# Patient Record
Sex: FEMALE | NOT HISPANIC OR LATINO | URBAN - METROPOLITAN AREA
[De-identification: names, ages, dates, MRNs, and addresses within clinical notes are randomized per-mention and may not be internally consistent; named-entity substitution may affect disease eponyms.]

---

## 2017-01-15 ENCOUNTER — INPATIENT (INPATIENT)
Facility: HOSPITAL | Age: 27
LOS: 1 days | Discharge: HOME | End: 2017-01-17
Attending: OBSTETRICS & GYNECOLOGY | Admitting: OBSTETRICS & GYNECOLOGY
Payer: MEDICAID

## 2017-01-15 VITALS
TEMPERATURE: 98 F | OXYGEN SATURATION: 98 % | HEART RATE: 62 BPM | SYSTOLIC BLOOD PRESSURE: 103 MMHG | DIASTOLIC BLOOD PRESSURE: 45 MMHG | RESPIRATION RATE: 16 BRPM

## 2017-01-15 LAB
ALBUMIN SERPL ELPH-MCNC: 4.5 G/DL — SIGNIFICANT CHANGE UP (ref 3.3–5)
ALP SERPL-CCNC: 77 U/L — SIGNIFICANT CHANGE UP (ref 40–120)
ALT FLD-CCNC: 10 U/L — SIGNIFICANT CHANGE UP (ref 4–33)
AMYLASE P1 CFR SERPL: 48 U/L — SIGNIFICANT CHANGE UP (ref 25–125)
APPEARANCE UR: SIGNIFICANT CHANGE UP
AST SERPL-CCNC: 13 U/L — SIGNIFICANT CHANGE UP (ref 4–32)
BACTERIA # UR AUTO: SIGNIFICANT CHANGE UP
BASOPHILS # BLD AUTO: 0.03 K/UL — SIGNIFICANT CHANGE UP (ref 0–0.2)
BASOPHILS NFR BLD AUTO: 0.3 % — SIGNIFICANT CHANGE UP (ref 0–2)
BILIRUB SERPL-MCNC: 0.2 MG/DL — SIGNIFICANT CHANGE UP (ref 0.2–1.2)
BILIRUB UR-MCNC: NEGATIVE — SIGNIFICANT CHANGE UP
BLOOD UR QL VISUAL: NEGATIVE — SIGNIFICANT CHANGE UP
BUN SERPL-MCNC: 11 MG/DL — SIGNIFICANT CHANGE UP (ref 7–23)
CALCIUM SERPL-MCNC: 9.5 MG/DL — SIGNIFICANT CHANGE UP (ref 8.4–10.5)
CHLORIDE SERPL-SCNC: 102 MMOL/L — SIGNIFICANT CHANGE UP (ref 98–107)
CO2 SERPL-SCNC: 25 MMOL/L — SIGNIFICANT CHANGE UP (ref 22–31)
COLOR SPEC: YELLOW — SIGNIFICANT CHANGE UP
CREAT SERPL-MCNC: 0.75 MG/DL — SIGNIFICANT CHANGE UP (ref 0.5–1.3)
EOSINOPHIL # BLD AUTO: 0.08 K/UL — SIGNIFICANT CHANGE UP (ref 0–0.5)
EOSINOPHIL NFR BLD AUTO: 0.7 % — SIGNIFICANT CHANGE UP (ref 0–6)
GLUCOSE SERPL-MCNC: 99 MG/DL — SIGNIFICANT CHANGE UP (ref 70–99)
GLUCOSE UR-MCNC: NEGATIVE — SIGNIFICANT CHANGE UP
HCT VFR BLD CALC: 40 % — SIGNIFICANT CHANGE UP (ref 34.5–45)
HGB BLD-MCNC: 13 G/DL — SIGNIFICANT CHANGE UP (ref 11.5–15.5)
IMM GRANULOCYTES NFR BLD AUTO: 0.2 % — SIGNIFICANT CHANGE UP (ref 0–1.5)
KETONES UR-MCNC: SIGNIFICANT CHANGE UP
LEUKOCYTE ESTERASE UR-ACNC: NEGATIVE — SIGNIFICANT CHANGE UP
LIDOCAIN IGE QN: 37 U/L — SIGNIFICANT CHANGE UP (ref 7–60)
LYMPHOCYTES # BLD AUTO: 2.87 K/UL — SIGNIFICANT CHANGE UP (ref 1–3.3)
LYMPHOCYTES # BLD AUTO: 25.9 % — SIGNIFICANT CHANGE UP (ref 13–44)
MCHC RBC-ENTMCNC: 29.1 PG — SIGNIFICANT CHANGE UP (ref 27–34)
MCHC RBC-ENTMCNC: 32.5 % — SIGNIFICANT CHANGE UP (ref 32–36)
MCV RBC AUTO: 89.7 FL — SIGNIFICANT CHANGE UP (ref 80–100)
MONOCYTES # BLD AUTO: 0.6 K/UL — SIGNIFICANT CHANGE UP (ref 0–0.9)
MONOCYTES NFR BLD AUTO: 5.4 % — SIGNIFICANT CHANGE UP (ref 2–14)
NEUTROPHILS # BLD AUTO: 7.49 K/UL — HIGH (ref 1.8–7.4)
NEUTROPHILS NFR BLD AUTO: 67.5 % — SIGNIFICANT CHANGE UP (ref 43–77)
NITRITE UR-MCNC: NEGATIVE — SIGNIFICANT CHANGE UP
PH UR: 6.5 — SIGNIFICANT CHANGE UP (ref 4.6–8)
PLATELET # BLD AUTO: 447 K/UL — HIGH (ref 150–400)
PMV BLD: 9.1 FL — SIGNIFICANT CHANGE UP (ref 7–13)
POTASSIUM SERPL-MCNC: 3.9 MMOL/L — SIGNIFICANT CHANGE UP (ref 3.5–5.3)
POTASSIUM SERPL-SCNC: 3.9 MMOL/L — SIGNIFICANT CHANGE UP (ref 3.5–5.3)
PROT SERPL-MCNC: 7.3 G/DL — SIGNIFICANT CHANGE UP (ref 6–8.3)
PROT UR-MCNC: 30 — HIGH
RBC # BLD: 4.46 M/UL — SIGNIFICANT CHANGE UP (ref 3.8–5.2)
RBC # FLD: 13.6 % — SIGNIFICANT CHANGE UP (ref 10.3–14.5)
RBC CASTS # UR COMP ASSIST: SIGNIFICANT CHANGE UP (ref 0–?)
SODIUM SERPL-SCNC: 139 MMOL/L — SIGNIFICANT CHANGE UP (ref 135–145)
SP GR SPEC: 1.04 — HIGH (ref 1–1.03)
SQUAMOUS # UR AUTO: SIGNIFICANT CHANGE UP
UROBILINOGEN FLD QL: 4 E.U. — HIGH (ref 0.1–0.2)
WBC # BLD: 11.09 K/UL — HIGH (ref 3.8–10.5)
WBC # FLD AUTO: 11.09 K/UL — HIGH (ref 3.8–10.5)
WBC UR QL: SIGNIFICANT CHANGE UP (ref 0–?)
YEAST BUDDING # UR COMP ASSIST: SIGNIFICANT CHANGE UP

## 2017-01-15 PROCEDURE — 74177 CT ABD & PELVIS W/CONTRAST: CPT | Mod: 26

## 2017-01-15 RX ORDER — MORPHINE SULFATE 50 MG/1
4 CAPSULE, EXTENDED RELEASE ORAL ONCE
Qty: 0 | Refills: 0 | Status: DISCONTINUED | OUTPATIENT
Start: 2017-01-15 | End: 2017-01-15

## 2017-01-15 RX ORDER — SODIUM CHLORIDE 9 MG/ML
2000 INJECTION INTRAMUSCULAR; INTRAVENOUS; SUBCUTANEOUS ONCE
Qty: 0 | Refills: 0 | Status: COMPLETED | OUTPATIENT
Start: 2017-01-15 | End: 2017-01-15

## 2017-01-15 RX ORDER — ONDANSETRON 8 MG/1
4 TABLET, FILM COATED ORAL ONCE
Qty: 0 | Refills: 0 | Status: COMPLETED | OUTPATIENT
Start: 2017-01-15 | End: 2017-01-15

## 2017-01-15 RX ADMIN — MORPHINE SULFATE 4 MILLIGRAM(S): 50 CAPSULE, EXTENDED RELEASE ORAL at 20:31

## 2017-01-15 RX ADMIN — MORPHINE SULFATE 4 MILLIGRAM(S): 50 CAPSULE, EXTENDED RELEASE ORAL at 18:00

## 2017-01-15 RX ADMIN — ONDANSETRON 4 MILLIGRAM(S): 8 TABLET, FILM COATED ORAL at 17:21

## 2017-01-15 RX ADMIN — MORPHINE SULFATE 4 MILLIGRAM(S): 50 CAPSULE, EXTENDED RELEASE ORAL at 17:21

## 2017-01-15 RX ADMIN — SODIUM CHLORIDE 2000 MILLILITER(S): 9 INJECTION INTRAMUSCULAR; INTRAVENOUS; SUBCUTANEOUS at 17:10

## 2017-01-15 NOTE — ED ADULT TRIAGE NOTE - CHIEF COMPLAINT QUOTE
pt amb to triage c/o RLQ pain since yesterday w/ pain radiating to R flank, N/V since yesterday, denies urinary symptoms, LMP past Wednesday, PMHx epilepsy

## 2017-01-15 NOTE — ED PROVIDER NOTE - OBJECTIVE STATEMENT
27yo female with pmh of epilepsy presents with right lower quadrant pain for one day. Pt states the pain started at right flank then towards rlq, with nausea and multiple episodes of nbnb emesis. Pt denies urinary symptoms, diarrhea, vaginal discharge/bleeding, ho of ovarian pathology, ho renal colic, cp/sob/palp, recent travel and sickness. LMP 1/9/16.

## 2017-01-16 VITALS
RESPIRATION RATE: 20 BRPM | OXYGEN SATURATION: 100 % | SYSTOLIC BLOOD PRESSURE: 115 MMHG | HEART RATE: 79 BPM | DIASTOLIC BLOOD PRESSURE: 62 MMHG

## 2017-01-16 DIAGNOSIS — Q07.00 ARNOLD-CHIARI SYNDROME WITHOUT SPINA BIFIDA OR HYDROCEPHALUS: Chronic | ICD-10-CM

## 2017-01-16 DIAGNOSIS — N83.519 TORSION OF OVARY AND OVARIAN PEDICLE, UNSPECIFIED SIDE: ICD-10-CM

## 2017-01-16 LAB
APTT BLD: 31 SEC — SIGNIFICANT CHANGE UP (ref 27.5–37.4)
BLD GP AB SCN SERPL QL: NEGATIVE — SIGNIFICANT CHANGE UP
INR BLD: 1.04 — SIGNIFICANT CHANGE UP (ref 0.87–1.18)
PROTHROM AB SERPL-ACNC: 11.9 SEC — SIGNIFICANT CHANGE UP (ref 10–13.1)
RH IG SCN BLD-IMP: POSITIVE — SIGNIFICANT CHANGE UP
RH IG SCN BLD-IMP: POSITIVE — SIGNIFICANT CHANGE UP

## 2017-01-16 PROCEDURE — 88305 TISSUE EXAM BY PATHOLOGIST: CPT | Mod: 26

## 2017-01-16 PROCEDURE — 76830 TRANSVAGINAL US NON-OB: CPT | Mod: 26

## 2017-01-16 PROCEDURE — 58662 LAPAROSCOPY EXCISE LESIONS: CPT | Mod: 22

## 2017-01-16 RX ORDER — SODIUM CHLORIDE 9 MG/ML
1000 INJECTION, SOLUTION INTRAVENOUS
Qty: 0 | Refills: 0 | Status: DISCONTINUED | OUTPATIENT
Start: 2017-01-16 | End: 2017-01-17

## 2017-01-16 RX ORDER — IBUPROFEN 200 MG
1 TABLET ORAL
Qty: 0 | Refills: 0 | COMMUNITY

## 2017-01-16 RX ORDER — OXYCODONE HYDROCHLORIDE 5 MG/1
1 TABLET ORAL
Qty: 20 | Refills: 0 | OUTPATIENT
Start: 2017-01-16

## 2017-01-16 RX ORDER — OXYCODONE HYDROCHLORIDE 5 MG/1
10 TABLET ORAL ONCE
Qty: 0 | Refills: 0 | Status: DISCONTINUED | OUTPATIENT
Start: 2017-01-16 | End: 2017-01-17

## 2017-01-16 RX ORDER — SODIUM CHLORIDE 9 MG/ML
1000 INJECTION, SOLUTION INTRAVENOUS
Qty: 0 | Refills: 0 | Status: DISCONTINUED | OUTPATIENT
Start: 2017-01-16 | End: 2017-01-16

## 2017-01-16 RX ORDER — GABAPENTIN 400 MG/1
1 CAPSULE ORAL
Qty: 0 | Refills: 0 | COMMUNITY

## 2017-01-16 RX ORDER — HYDROMORPHONE HYDROCHLORIDE 2 MG/ML
1 INJECTION INTRAMUSCULAR; INTRAVENOUS; SUBCUTANEOUS
Qty: 0 | Refills: 0 | Status: DISCONTINUED | OUTPATIENT
Start: 2017-01-16 | End: 2017-01-17

## 2017-01-16 RX ORDER — ACETAMINOPHEN 500 MG
650 TABLET ORAL ONCE
Qty: 0 | Refills: 0 | Status: DISCONTINUED | OUTPATIENT
Start: 2017-01-16 | End: 2017-01-17

## 2017-01-16 RX ORDER — ACETAMINOPHEN 500 MG
3 TABLET ORAL
Qty: 0 | Refills: 0 | COMMUNITY

## 2017-01-16 RX ORDER — KETOROLAC TROMETHAMINE 30 MG/ML
30 SYRINGE (ML) INJECTION ONCE
Qty: 0 | Refills: 0 | Status: DISCONTINUED | OUTPATIENT
Start: 2017-01-16 | End: 2017-01-17

## 2017-01-16 RX ORDER — LAMOTRIGINE 25 MG/1
2 TABLET, ORALLY DISINTEGRATING ORAL
Qty: 0 | Refills: 0 | COMMUNITY

## 2017-01-16 RX ORDER — ACETAMINOPHEN 500 MG
1000 TABLET ORAL ONCE
Qty: 0 | Refills: 0 | Status: COMPLETED | OUTPATIENT
Start: 2017-01-16 | End: 2017-01-16

## 2017-01-16 RX ORDER — ONDANSETRON 8 MG/1
4 TABLET, FILM COATED ORAL ONCE
Qty: 0 | Refills: 0 | Status: DISCONTINUED | OUTPATIENT
Start: 2017-01-16 | End: 2017-01-17

## 2017-01-16 RX ADMIN — Medication 1000 MILLIGRAM(S): at 05:53

## 2017-01-16 RX ADMIN — Medication 400 MILLIGRAM(S): at 05:15

## 2017-01-16 RX ADMIN — SODIUM CHLORIDE 125 MILLILITER(S): 9 INJECTION, SOLUTION INTRAVENOUS at 07:23

## 2017-01-16 RX ADMIN — SODIUM CHLORIDE 125 MILLILITER(S): 9 INJECTION, SOLUTION INTRAVENOUS at 03:37

## 2017-01-16 NOTE — PATIENT PROFILE ADULT. - LANGUAGE ASSISTANCE NEEDED
No-Patient/Caregiver offered and refused free interpretation services./pt able to make needs known in english

## 2017-01-16 NOTE — DISCHARGE NOTE ADULT - CARE PROVIDER_API CALL
Chen Zamudio), Obstetrics and Gynecology  [address] [place] [address]  Phone: [phone]  Fax: [phone]

## 2017-01-16 NOTE — DISCHARGE NOTE ADULT - CARE PLAN
Principal Discharge DX:	Ovarian torsion  Goal:	recovery  Instructions for follow-up, activity and diet:	Follow up with [facility], activity as tolerated, regular diet.  Pelvic rest X2 weeks

## 2017-01-16 NOTE — H&P ADULT. - PROBLEM SELECTOR PLAN 1
1) admit to GYN  2) NPO IVF  3) for OR for detorsion of ovary, right ovarian cystectomy  Jose R2  D/w Emely Pickett and Dr. Ward

## 2017-01-16 NOTE — DISCHARGE NOTE ADULT - CARE PROVIDERS DIRECT ADDRESSES
[email or web address]

## 2017-01-16 NOTE — DISCHARGE NOTE ADULT - MEDICATION SUMMARY - MEDICATIONS TO TAKE
I will START or STAY ON the medications listed below when I get home from the hospital:    Tylenol 325 mg oral tablet  -- 3 tab(s) by mouth every 6 hours  -- Indication: For pain    Motrin 600 mg oral tablet  -- 1 tab(s) by mouth every 6 hours  -- Indication: For pain    oxyCODONE 5 mg oral tablet  -- 1 tab(s) by mouth every 6 hours, As Needed MDD:4  -- Indication: For pain

## 2017-01-16 NOTE — BRIEF OPERATIVE NOTE - OPERATION/FINDINGS
Large right ovarian cyst, omental adhesion, uterus adherent to anterior abdominal wall, normal appearing left ovary and tubes

## 2017-01-16 NOTE — H&P ADULT. - HISTORY OF PRESENT ILLNESS
26  LMP 16 c/o back pain that started at 2PM yesterday-sharp and stabbing, relieved by lying on her left side.  When she is lying straight, standing, or walking, the stabbing pain is unbearable.  +nausea +vomiting.  Never expereinced this pain before.  Denies dysuria, CP/SOB/dizziness/fever/chills  ob hx: C/s X2 in [place]  gyn hx: denies h/o cysts, has not seen GYN [facility] [place] [facility]  pmh: abscence seizures-last seizure 6 years ago, last convulsion one year ago, Chiari malformation (2)  psh C/sX2, Chiari repair at 21 yo  all none  meds lamictal, cymbalta, gabapentin  fam hx denies  social smokes 1 pack/3 days  Last meal 1:40A

## 2017-01-16 NOTE — PRE-OP CHECKLIST - 2.
patient preferred to keep belongings down stairs in locker area patient preferred to keep belongings down stairs in locker area locker#52 top/bottom

## 2017-01-16 NOTE — DISCHARGE NOTE ADULT - PATIENT PORTAL LINK FT
“You can access the Follow[employer] Patient Portal, offered by [facility], by registering with the following website: [email or web address]”

## 2017-01-16 NOTE — DISCHARGE NOTE ADULT - HOSPITAL COURSE
26  a/f ovarian torsion-detorsion, s/p laparoscopic detorsion of ovary, right ovarian cystectomy, see operative note for details of procedure.  At time of discharge patient is ambulating, tolerating regular diet, pain controlled, and urinating spontaneously.  Patient to have close follow up with [facility].

## 2017-01-25 LAB — SURGICAL PATHOLOGY STUDY: SIGNIFICANT CHANGE UP

## 2017-11-08 PROBLEM — Q07.00 ARNOLD-CHIARI SYNDROME WITHOUT SPINA BIFIDA OR HYDROCEPHALUS: Chronic | Status: ACTIVE | Noted: 2017-01-16

## 2017-11-08 PROBLEM — G40.909 EPILEPSY, UNSPECIFIED, NOT INTRACTABLE, WITHOUT STATUS EPILEPTICUS: Chronic | Status: ACTIVE | Noted: 2017-01-15

## 2017-11-15 ENCOUNTER — APPOINTMENT (OUTPATIENT)
Dept: NEUROSURGERY | Facility: CLINIC | Age: 27
End: 2017-11-15
Payer: MEDICAID

## 2017-11-15 VITALS
SYSTOLIC BLOOD PRESSURE: 113 MMHG | DIASTOLIC BLOOD PRESSURE: 71 MMHG | WEIGHT: 160 LBS | BODY MASS INDEX: 29.44 KG/M2 | OXYGEN SATURATION: 98 % | HEART RATE: 80 BPM | HEIGHT: 62 IN

## 2017-11-15 DIAGNOSIS — Z00.00 ENCOUNTER FOR GENERAL ADULT MEDICAL EXAMINATION W/OUT ABNORMAL FINDINGS: ICD-10-CM

## 2017-11-15 DIAGNOSIS — Z87.898 PERSONAL HISTORY OF OTHER SPECIFIED CONDITIONS: ICD-10-CM

## 2017-11-15 PROCEDURE — 99205 OFFICE O/P NEW HI 60 MIN: CPT

## 2017-11-15 RX ORDER — LAMOTRIGINE 200 MG/1
200 TABLET, ORALLY DISINTEGRATING ORAL
Refills: 0 | Status: ACTIVE | COMMUNITY

## 2017-11-15 RX ORDER — GABAPENTIN 400 MG/1
CAPSULE ORAL
Refills: 0 | Status: ACTIVE | COMMUNITY

## 2017-12-01 ENCOUNTER — OUTPATIENT (OUTPATIENT)
Dept: OUTPATIENT SERVICES | Facility: HOSPITAL | Age: 27
LOS: 1 days | Discharge: HOME | End: 2017-12-01
Payer: COMMERCIAL

## 2017-12-01 DIAGNOSIS — Q07.00 ARNOLD-CHIARI SYNDROME WITHOUT SPINA BIFIDA OR HYDROCEPHALUS: Chronic | ICD-10-CM

## 2017-12-01 LAB
ALBUMIN SERPL ELPH-MCNC: 4.8 G/DL — SIGNIFICANT CHANGE UP (ref 3.3–5)
ALP SERPL-CCNC: 83 U/L — SIGNIFICANT CHANGE UP (ref 40–120)
ALT FLD-CCNC: 11 U/L — SIGNIFICANT CHANGE UP (ref 10–45)
ANION GAP SERPL CALC-SCNC: 14 MMOL/L — SIGNIFICANT CHANGE UP (ref 5–17)
APTT BLD: 38.7 SEC — HIGH (ref 27.5–37.4)
AST SERPL-CCNC: 14 U/L — SIGNIFICANT CHANGE UP (ref 10–40)
BILIRUB SERPL-MCNC: 0.4 MG/DL — SIGNIFICANT CHANGE UP (ref 0.2–1.2)
BUN SERPL-MCNC: 13 MG/DL — SIGNIFICANT CHANGE UP (ref 7–23)
CALCIUM SERPL-MCNC: 9.8 MG/DL — SIGNIFICANT CHANGE UP (ref 8.4–10.5)
CHLORIDE SERPL-SCNC: 96 MMOL/L — SIGNIFICANT CHANGE UP (ref 96–108)
CO2 SERPL-SCNC: 22 MMOL/L — SIGNIFICANT CHANGE UP (ref 22–31)
CREAT SERPL-MCNC: 0.78 MG/DL — SIGNIFICANT CHANGE UP (ref 0.5–1.3)
GLUCOSE SERPL-MCNC: 90 MG/DL — SIGNIFICANT CHANGE UP (ref 70–99)
HCG SERPL-ACNC: <.1 MIU/ML — SIGNIFICANT CHANGE UP
HCT VFR BLD CALC: 38.5 % — SIGNIFICANT CHANGE UP (ref 34.5–45)
HGB BLD-MCNC: 13.2 G/DL — SIGNIFICANT CHANGE UP (ref 11.5–15.5)
INR BLD: 1.03 — SIGNIFICANT CHANGE UP (ref 0.88–1.16)
MCHC RBC-ENTMCNC: 30.2 PG — SIGNIFICANT CHANGE UP (ref 27–34)
MCHC RBC-ENTMCNC: 34.3 G/DL — SIGNIFICANT CHANGE UP (ref 32–36)
MCV RBC AUTO: 88.1 FL — SIGNIFICANT CHANGE UP (ref 80–100)
PLATELET # BLD AUTO: 455 K/UL — HIGH (ref 150–400)
POTASSIUM SERPL-MCNC: 3.9 MMOL/L — SIGNIFICANT CHANGE UP (ref 3.5–5.3)
POTASSIUM SERPL-SCNC: 3.9 MMOL/L — SIGNIFICANT CHANGE UP (ref 3.5–5.3)
PROT SERPL-MCNC: 8.1 G/DL — SIGNIFICANT CHANGE UP (ref 6–8.3)
PROTHROM AB SERPL-ACNC: 11.4 SEC — SIGNIFICANT CHANGE UP (ref 9.8–12.7)
RBC # BLD: 4.37 M/UL — SIGNIFICANT CHANGE UP (ref 3.8–5.2)
RBC # FLD: 13.7 % — SIGNIFICANT CHANGE UP (ref 10.3–16.9)
SODIUM SERPL-SCNC: 132 MMOL/L — LOW (ref 135–145)
WBC # BLD: 10.4 K/UL — SIGNIFICANT CHANGE UP (ref 3.8–10.5)
WBC # FLD AUTO: 10.4 K/UL — SIGNIFICANT CHANGE UP (ref 3.8–10.5)

## 2017-12-01 PROCEDURE — 36223 PLACE CATH CAROTID/INOM ART: CPT | Mod: 59,LT

## 2017-12-01 PROCEDURE — C1769: CPT

## 2017-12-01 PROCEDURE — 85730 THROMBOPLASTIN TIME PARTIAL: CPT

## 2017-12-01 PROCEDURE — 80053 COMPREHEN METABOLIC PANEL: CPT

## 2017-12-01 PROCEDURE — 36224 PLACE CATH CAROTD ART: CPT | Mod: RT

## 2017-12-01 PROCEDURE — 36226 PLACE CATH VERTEBRAL ART: CPT | Mod: LT

## 2017-12-01 PROCEDURE — 85610 PROTHROMBIN TIME: CPT

## 2017-12-01 PROCEDURE — C1894: CPT

## 2017-12-01 PROCEDURE — 85027 COMPLETE CBC AUTOMATED: CPT

## 2017-12-01 PROCEDURE — 84702 CHORIONIC GONADOTROPIN TEST: CPT

## 2017-12-01 PROCEDURE — 36225 PLACE CATH SUBCLAVIAN ART: CPT | Mod: LT

## 2017-12-01 PROCEDURE — 36227 PLACE CATH XTRNL CAROTID: CPT | Mod: RT

## 2017-12-01 PROCEDURE — C1887: CPT

## 2017-12-01 PROCEDURE — 36415 COLL VENOUS BLD VENIPUNCTURE: CPT

## 2017-12-01 RX ORDER — SODIUM CHLORIDE 9 MG/ML
1000 INJECTION INTRAMUSCULAR; INTRAVENOUS; SUBCUTANEOUS
Qty: 0 | Refills: 0 | Status: DISCONTINUED | OUTPATIENT
Start: 2017-12-01 | End: 2017-12-01

## 2017-12-01 RX ORDER — ACETAMINOPHEN 500 MG
650 TABLET ORAL ONCE
Qty: 0 | Refills: 0 | Status: DISCONTINUED | OUTPATIENT
Start: 2017-12-01 | End: 2017-12-01

## 2017-12-01 RX ADMIN — SODIUM CHLORIDE 75 MILLILITER(S): 9 INJECTION INTRAMUSCULAR; INTRAVENOUS; SUBCUTANEOUS at 09:25

## 2017-12-01 NOTE — BRIEF OPERATIVE NOTE - POST-OP DX
AVM (arteriovenous malformation) brain  12/01/2017  right anterior temple AVM  Active  Giovani Montero

## 2017-12-01 NOTE — BRIEF OPERATIVE NOTE - PROCEDURE
<<-----Click on this checkbox to enter Procedure Angiogram, cerebral  12/01/2017    [facility]

## 2018-02-22 ENCOUNTER — APPOINTMENT (OUTPATIENT)
Dept: NEUROSURGERY | Facility: CLINIC | Age: 28
End: 2018-02-22
Payer: MEDICAID

## 2018-02-22 VITALS
HEART RATE: 72 BPM | WEIGHT: 163 LBS | BODY MASS INDEX: 30 KG/M2 | DIASTOLIC BLOOD PRESSURE: 58 MMHG | HEIGHT: 62 IN | SYSTOLIC BLOOD PRESSURE: 90 MMHG | OXYGEN SATURATION: 100 %

## 2018-02-22 DIAGNOSIS — Q28.2 ARTERIOVENOUS MALFORMATION OF CEREBRAL VESSELS: ICD-10-CM

## 2018-02-22 DIAGNOSIS — G44.1 VASCULAR HEADACHE, NOT ELSEWHERE CLASSIFIED: ICD-10-CM

## 2018-02-22 DIAGNOSIS — Z82.0 FAMILY HISTORY OF EPILEPSY AND OTHER DISEASES OF THE NERVOUS SYSTEM: ICD-10-CM

## 2018-02-22 DIAGNOSIS — G40.909 EPILEPSY, UNSPECIFIED, NOT INTRACTABLE, W/OUT STATUS EPILEPTICUS: ICD-10-CM

## 2018-02-22 PROCEDURE — 99215 OFFICE O/P EST HI 40 MIN: CPT

## 2018-02-27 PROBLEM — G44.1 OTHER VASCULAR HEADACHE: Status: ACTIVE | Noted: 2017-11-15

## 2018-02-27 PROBLEM — G40.909 SEIZURE DISORDER: Status: ACTIVE | Noted: 2017-11-15

## 2018-02-27 PROBLEM — Z82.0 FAMILY HISTORY OF EPILEPSY: Status: ACTIVE | Noted: 2018-02-27

## 2018-02-27 PROBLEM — Q28.2 ARTERIOVENOUS MALFORMATION, BRAIN: Status: ACTIVE | Noted: 2017-11-15

## 2023-06-06 NOTE — ED PROVIDER NOTE - CPE EDP NEURO NORM
Group Topic: BH Process Group     Date: 6/6/2023  Start Time: 11:30 AM  End Time: 12:30 PM  Facilitators: Brigette Dash, OT; Ramírez Pardo LCSW; Dragan Thomas LCSW      Number in attendance: 5  60 min group provided via Zoom Telehealth    Focus: Education re: Healthy Communication and Assertiveness.  Identifying the components of healthy communication, identifying/comparing the 4 styles of communication, and how one's communication and communication style can effect one's feelings, self esteem, relationships.      Pt was educated re: importance of healthy communication and 4 styles of communication  (e.g. passive, aggressive, passive-aggressive, assertive).  Pt was an active participant in group, shared personal experiences re: one's own communication/style and challenges for assertive communication.  Pt appeared engaged as pt nodded along with today's group discussion, demonstrated good eye contact and verbally responded to comments from peers and prompts from group facilitator.  Pt was pleasant, cooperative during discussion and supportive of peers.       Method: Group  Attendance: Present  Participation: Active  Patient Response: Attentive  Mood: Anxious  Affect: Type: Depressed   Range: Restricted   Congruency: Congruent   Stability: Stable  Behavior/Socialization: Appropriate to group, Cooperative and Engaged  Thought Process: Focused  Task Performance: Follows directions  Patient Evaluation: Independent - full participation   Brigette Dash MS OTR/L           normal...

## 2023-06-23 NOTE — ED PROVIDER NOTE - AREA
Colitis / constipation with fecal impaction (resolved), cachexia, debility  - CT Abd/Pelvis on presentation with ileus/constipation and fecal impaction with signs of stercoral colitis. Colorectal consulted and disimpacted with partial relief 06/16, underwent enema / bowel regimen with resolution.  - GI consulted and underwent EGD 06/19 showing grade D esophagitis, lavell-en-Y gastrojejunostomy with healthy-appearing anastomosis.  - Continue bowel regimen.  - SLP following, underwent MBSS 06/20 which showed laryngeal penetration with all consistencies.  - Discussed goals of care; would not be interested in PEG placement.  - Appreciate palliative assistance.     lower

## 2024-01-08 NOTE — H&P ADULT. - CENTRAL VENOUS CATHETER
54yo F with extensive PMHx with new onset abd pain and hematochezia and CT findings suggesting bowel perforation. The imaging was reviewed with radiology; this is more likely enteritis than bowel perforation. Abdomen benign with distractible tenderness.   Gastrograffin study shows passage of contrast but slow transit.     Recommendations:    - NG adjusted to be in the stomach, monitor NG tube output and performed another clamp trial today   - IV abx for enteritis  - GI reccs   - serial abd exams  - rest of care per primary team    Will discuss plan with Dr. Xiao no